# Patient Record
(demographics unavailable — no encounter records)

---

## 2017-11-22 NOTE — C.PDOC
History Of Present Illness


27 y/o female, with no significant PMHx, presents to ED c/o upper abdominal 

pain associated with nausea and diarrhea. Pt reports occasional fever for the 

past 2 days. Denies vomiting, urinary symptoms, or other complaints at this 

time. LMP 10/25/17





Time Seen by Provider: 11/22/17 10:07


Chief Complaint (Nursing): Abdominal Pain


History Per: Patient


History/Exam Limitations: no limitations


Onset/Duration Of Symptoms: Days


Current Symptoms Are (Timing): Still Present


Location Of Pain/Discomfort: Epigastric


Radiation Of Pain To:: None


Quality Of Discomfort: "Pain"


Associated Symptoms: Fever, Nausea, Diarrhea.  denies: Vomiting, Loss Of 

Appetite, Back Pain, Chest Pain, Constipation, Urinary Symptoms


Exacerbating Factors: None


Alleviating Factors: None


Recent travel outside of the [place]: No


Additional History Per: Patient


Abnormal Vaginal Bleeding: No





Past Medical History


Reviewed: Historical Data, Nursing Documentation, Vital Signs


Vital Signs: 


 Last Vital Signs











Temp  98.5 F   11/22/17 10:05


 


Pulse  77   11/22/17 12:55


 


Resp  16   11/22/17 12:55


 


BP  107/58 L  11/22/17 12:55


 


Pulse Ox  99   11/22/17 13:14














- Medical History


PMH: No Chronic Diseases





- [facility] Procedures








ARTIF RUPT MEMBRANES NEC (02/07/15)


MANUAL ASSIST DELIV NEC (02/07/15)








Family History: States: Unknown Family Hx





- Social History


Hx Tobacco Use: No


Hx Alcohol Use: No


Hx Substance Use: No





- Immunization History


Hx Tetanus Toxoid Vaccination: No


Hx Influenza Vaccination: No


Hx Pneumococcal Vaccination: No





Review Of Systems


Except As Marked, All Systems Reviewed And Found Negative.


Constitutional: Positive for: Fever


Cardiovascular: Negative for: Chest Pain


Gastrointestinal: Positive for: Nausea, Abdominal Pain, Diarrhea.  Negative for

: Vomiting, Constipation, Melena, Hematochezia


Genitourinary: Negative for: Dysuria, Frequency, Hematuria, Vaginal Discharge


Musculoskeletal: Negative for: Back Pain





Physical Exam





- Physical Exam


Appears: Non-toxic, No Acute Distress


Skin: Normal Color, Warm, Dry


Head: Atraumatic, Normacephalic


Eye(s): bilateral: Normal Inspection


Oral Mucosa: Moist


Neck: Supple


Chest: Symmetrical


Cardiovascular: Rhythm Regular, No Murmur


Respiratory: Normal Breath Sounds, No Rales, No Rhonchi, No Wheezing


Gastrointestinal/Abdominal: Soft, Tenderness (epigastric), No Guarding, No 

Rebound


Back: No CVA Tenderness


Extremity: Normal ROM


Neurological/Psych: Oriented x3, Normal Speech





ED Course And Treatment





- Laboratory Results


Result Diagrams: 


 11/22/17 10:33





 11/22/17 10:33


Lab Interpretation: Normal


Urine Pregnancy POC: Negative


O2 Sat by Pulse Oximetry: 99


Pulse Ox Interpretation: Normal


Progress Note: Blood work, UA ordered and reviewed. Patient was given Pepcid, 

Zofran, and IV fluids.  Treated with toradol 30 mg IV.  On re-evaluation 

abdomen soft non-tender, in no distress


Reassessment Condition: Improved





Disposition


Counseled Patient/Family Regarding: Studies Performed, Diagnosis, Need For 

Followup





- Disposition


Referrals: 


[facility] [Outside]


[facility] [Outside]


Disposition: HOME/ ROUTINE


Disposition Time: 12:40


Condition: STABLE


Additional Instructions: 


Follow up with your PMD or clinic for further evaluation





Return to ED if any increase symptoms





Prescriptions: 


Ondansetron ODT [Zofran ODT] 1 odt PO BID PRN #6 odt


 PRN Reason: Nausea/Vomiting


Instructions:  Gastroenteritis (ED), Acute Diarrhea (ED)


Forms:  [facility] (English)


Print Language: Vietnamese





- POA


Present On Arrival: None





- Clinical Impression


Clinical Impression: 


 Diarrhea, Nausea, Abdominal pain








- PA / NP / Resident Statement


MD/DO has reviewed & agrees with the documentation as recorded.





- Scribe Statement


The provider has reviewed the documentation as recorded by the Kaylinibscotty Bueno





All medical record entries made by the Kaylinibscotty were at my direction and 

personally dictated by me. I have reviewed the chart and agree that the record 

accurately reflects my personal performance of the history, physical exam, 

medical decision making, and the department course for this patient. I have 

also personally directed, reviewed, and agree with the discharge instructions 

and disposition.

## 2018-05-07 NOTE — C.PDOC
History Of Present Illness


27 y/o female  currently 14 weeks pregnant presents to ED with c/o headache, 

body aches, cough, nose congestion with associated nose bleeding for 1 week. 

Patient reports sore throat and states she has not taken medication for 

symptoms. Patient denies vaginal bleeding, fever, nausea, vomiting or any other 

complaints at this time. 


Time Seen by Provider: 05/07/18 12:54


Chief Complaint (Nursing): Cough, Cold, Congestion


History Per: Patient


History/Exam Limitations: no limitations


Onset/Duration Of Symptoms: Days


Current Symptoms Are (Timing): Still Present


Associated Symptoms: Sore Throat, Cough, Nasal Congestion.  denies: Fever, 

Chills





Past Medical History


Reviewed: Historical Data, Nursing Documentation, Vital Signs


Vital Signs: 


 Last Vital Signs











Temp  98.3 F   05/07/18 11:56


 


Pulse  84   05/07/18 11:56


 


Resp  16   05/07/18 11:56


 


BP  102/68   05/07/18 11:56


 


Pulse Ox  99   05/07/18 14:48














- Medical History


PMH: No Chronic Diseases


Surgical History: No Surg Hx





- [facility] Procedures








ARTIF RUPT MEMBRANES NEC (02/07/15)


MANUAL ASSIST DELIV NEC (02/07/15)








Family History: States: No Known Family Hx





- Social History


Hx Tobacco Use: No


Hx Alcohol Use: No


Hx Substance Use: No





- Immunization History


Hx Tetanus Toxoid Vaccination: No


Hx Influenza Vaccination: Yes


Hx Pneumococcal Vaccination: No





Review Of Systems


Constitutional: Negative for: Fever, Chills


ENT: Positive for: Nose Congestion, Throat Pain


Cardiovascular: Negative for: Chest Pain


Respiratory: Positive for: Cough


Gastrointestinal: Negative for: Nausea, Vomiting


Genitourinary: Negative for: Vaginal Bleeding


Neurological: Positive for: Headache





Physical Exam





- Physical Exam


Appears: Non-toxic, No Acute Distress


Skin: Warm, Dry, No Rash


Head: Atraumatic, Normacephalic


Eye(s): bilateral: Normal Inspection


Ear(s): Bilateral: Normal


Oral Mucosa: Moist


Throat: Normal, No Erythema, No Exudate


Neck: Normal ROM, Supple


Cardiovascular: Rhythm Regular


Respiratory: Normal Breath Sounds, No Rales, No Rhonchi, No Wheezing


Gastrointestinal/Abdominal: Soft, No Tenderness, No Guarding, No Rebound


Neurological/Psych: Oriented x3, Normal Speech, Normal Cognition





ED Course And Treatment


O2 Sat by Pulse Oximetry: 99 (RA)


Pulse Ox Interpretation: Normal


Progress Note: Tylenol administered. Flu swab ordered





Disposition


Counseled Patient/Family Regarding: Studies Performed, Diagnosis





- Disposition


Referrals: 


[facility] [Outside]


Disposition: HOME/ ROUTINE


Disposition Time: 14:39


Condition: STABLE


Additional Instructions: 


Take Tylenol for pain. Return to the Emergency Department if you develop fever. 


 Use nasal saline to clear your nose. 


Honey, lime and phoenix mix and/or tea for your throat. 


Instructions:  Viral Syndrome (DC)


Forms:  Work Excuse, [facility] (Yi), Gen Discharge Inst Yi





- POA


Present On Arrival: None





- Clinical Impression


Clinical Impression: 


 Influenza-like illness, Pregnancy








- Scribe Statement


The provider has reviewed the documentation as recorded by the Scribscotty Michaels





All medical record entries made by the Kaylinibscotty were at my direction and 

personally dictated by me. I have reviewed the chart and agree that the record 

accurately reflects my personal performance of the history, physical exam, 

medical decision making, and the department course for this patient. I have 

also personally directed, reviewed, and agree with the discharge instructions 

and disposition.